# Patient Record
Sex: MALE | Race: WHITE | Employment: FULL TIME | URBAN - METROPOLITAN AREA
[De-identification: names, ages, dates, MRNs, and addresses within clinical notes are randomized per-mention and may not be internally consistent; named-entity substitution may affect disease eponyms.]

---

## 2017-04-12 ENCOUNTER — HOSPITAL ENCOUNTER (OUTPATIENT)
Dept: LAB | Age: 67
Discharge: HOME | End: 2017-04-12
Payer: COMMERCIAL

## 2017-04-12 DIAGNOSIS — J44.9 CHRONIC OBSTRUCTIVE PULMONARY DISEASE, UNSPECIFIED COPD TYPE (HCC): Primary | ICD-10-CM

## 2017-04-12 DIAGNOSIS — J44.9 CHRONIC OBSTRUCTIVE PULMONARY DISEASE, UNSPECIFIED COPD TYPE (HCC): ICD-10-CM

## 2017-04-12 LAB
ALBUMIN SERPL BCP-MCNC: 3.8 G/DL (ref 3.4–5)
ALBUMIN/GLOB SERPL: 1.2 {RATIO} (ref 0.8–1.7)
ALP SERPL-CCNC: 72 U/L (ref 45–117)
ALT SERPL-CCNC: 33 U/L (ref 16–61)
ANION GAP BLD CALC-SCNC: 6 MMOL/L (ref 3–18)
APPEARANCE UR: CLEAR
AST SERPL W P-5'-P-CCNC: 15 U/L (ref 15–37)
BASOPHILS # BLD AUTO: 0 K/UL (ref 0–0.06)
BASOPHILS # BLD: 1 % (ref 0–2)
BILIRUB SERPL-MCNC: 0.8 MG/DL (ref 0.2–1)
BILIRUB UR QL: NEGATIVE
BUN SERPL-MCNC: 14 MG/DL (ref 7–18)
BUN/CREAT SERPL: 16 (ref 12–20)
CALCIUM SERPL-MCNC: 8.5 MG/DL (ref 8.5–10.1)
CHLORIDE SERPL-SCNC: 107 MMOL/L (ref 100–108)
CHOLEST SERPL-MCNC: 133 MG/DL
CO2 SERPL-SCNC: 29 MMOL/L (ref 21–32)
COLOR UR: YELLOW
CREAT SERPL-MCNC: 0.87 MG/DL (ref 0.6–1.3)
DIFFERENTIAL METHOD BLD: ABNORMAL
EOSINOPHIL # BLD: 0.1 K/UL (ref 0–0.4)
EOSINOPHIL NFR BLD: 2 % (ref 0–5)
ERYTHROCYTE [DISTWIDTH] IN BLOOD BY AUTOMATED COUNT: 11.1 % (ref 11.6–14.5)
GLOBULIN SER CALC-MCNC: 3.2 G/DL (ref 2–4)
GLUCOSE SERPL-MCNC: 106 MG/DL (ref 74–99)
GLUCOSE UR STRIP.AUTO-MCNC: NEGATIVE MG/DL
HCT VFR BLD AUTO: 42.2 % (ref 36–48)
HDLC SERPL-MCNC: 48 MG/DL (ref 40–60)
HDLC SERPL: 2.8 {RATIO} (ref 0–5)
HGB BLD-MCNC: 13.6 G/DL (ref 13–16)
HGB UR QL STRIP: NEGATIVE
KETONES UR QL STRIP.AUTO: NEGATIVE MG/DL
LDLC SERPL CALC-MCNC: 67.4 MG/DL (ref 0–100)
LEUKOCYTE ESTERASE UR QL STRIP.AUTO: NEGATIVE
LIPID PROFILE,FLP: NORMAL
LYMPHOCYTES # BLD AUTO: 27 % (ref 21–52)
LYMPHOCYTES # BLD: 1.8 K/UL (ref 0.9–3.6)
MCH RBC QN AUTO: 33.7 PG (ref 24–34)
MCHC RBC AUTO-ENTMCNC: 32.2 G/DL (ref 31–37)
MCV RBC AUTO: 104.5 FL (ref 74–97)
MONOCYTES # BLD: 0.6 K/UL (ref 0.05–1.2)
MONOCYTES NFR BLD AUTO: 9 % (ref 3–10)
NEUTS SEG # BLD: 4.1 K/UL (ref 1.8–8)
NEUTS SEG NFR BLD AUTO: 61 % (ref 40–73)
NITRITE UR QL STRIP.AUTO: NEGATIVE
PH UR STRIP: 7.5 [PH] (ref 5–8)
PLATELET # BLD AUTO: 297 K/UL (ref 135–420)
PMV BLD AUTO: 10.5 FL (ref 9.2–11.8)
POTASSIUM SERPL-SCNC: 4.2 MMOL/L (ref 3.5–5.5)
PROT SERPL-MCNC: 7 G/DL (ref 6.4–8.2)
PROT UR STRIP-MCNC: NEGATIVE MG/DL
PSA SERPL-MCNC: 0.7 NG/ML (ref 0–4)
RBC # BLD AUTO: 4.04 M/UL (ref 4.7–5.5)
SODIUM SERPL-SCNC: 142 MMOL/L (ref 136–145)
SP GR UR REFRACTOMETRY: 1.01 (ref 1–1.03)
TRIGL SERPL-MCNC: 88 MG/DL (ref ?–150)
TSH SERPL DL<=0.05 MIU/L-ACNC: 1.01 UIU/ML (ref 0.36–3.74)
UROBILINOGEN UR QL STRIP.AUTO: 0.2 EU/DL (ref 0.2–1)
VLDLC SERPL CALC-MCNC: 17.6 MG/DL
WBC # BLD AUTO: 6.7 K/UL (ref 4.6–13.2)

## 2017-04-12 PROCEDURE — 84443 ASSAY THYROID STIM HORMONE: CPT | Performed by: FAMILY MEDICINE

## 2017-04-12 PROCEDURE — 80061 LIPID PANEL: CPT | Performed by: FAMILY MEDICINE

## 2017-04-12 PROCEDURE — 85025 COMPLETE CBC W/AUTO DIFF WBC: CPT | Performed by: FAMILY MEDICINE

## 2017-04-12 PROCEDURE — 84153 ASSAY OF PSA TOTAL: CPT | Performed by: FAMILY MEDICINE

## 2017-04-12 PROCEDURE — 81003 URINALYSIS AUTO W/O SCOPE: CPT | Performed by: FAMILY MEDICINE

## 2017-04-12 PROCEDURE — 80053 COMPREHEN METABOLIC PANEL: CPT | Performed by: FAMILY MEDICINE

## 2017-04-12 PROCEDURE — 36415 COLL VENOUS BLD VENIPUNCTURE: CPT | Performed by: FAMILY MEDICINE

## 2017-06-14 ENCOUNTER — OFFICE VISIT (OUTPATIENT)
Dept: FAMILY MEDICINE CLINIC | Age: 67
End: 2017-06-14

## 2017-06-14 VITALS
SYSTOLIC BLOOD PRESSURE: 163 MMHG | DIASTOLIC BLOOD PRESSURE: 90 MMHG | HEART RATE: 69 BPM | WEIGHT: 235.4 LBS | TEMPERATURE: 96.3 F | BODY MASS INDEX: 34.87 KG/M2 | HEIGHT: 69 IN | RESPIRATION RATE: 18 BRPM | OXYGEN SATURATION: 98 %

## 2017-06-14 DIAGNOSIS — G47.30 SLEEP APNEA, UNSPECIFIED TYPE: ICD-10-CM

## 2017-06-14 DIAGNOSIS — J44.9 CHRONIC OBSTRUCTIVE PULMONARY DISEASE, UNSPECIFIED COPD TYPE (HCC): ICD-10-CM

## 2017-06-14 DIAGNOSIS — Z00.00 ROUTINE GENERAL MEDICAL EXAMINATION AT A HEALTH CARE FACILITY: Primary | ICD-10-CM

## 2017-06-14 RX ORDER — MONTELUKAST SODIUM 10 MG/1
10 TABLET ORAL DAILY
Qty: 90 TAB | Refills: 4 | Status: SHIPPED | OUTPATIENT
Start: 2017-06-14 | End: 2018-07-05 | Stop reason: SDUPTHER

## 2017-06-14 RX ORDER — ALBUTEROL SULFATE 90 UG/1
2 AEROSOL, METERED RESPIRATORY (INHALATION)
Qty: 1 INHALER | Refills: 12 | Status: SHIPPED | OUTPATIENT
Start: 2017-06-14

## 2017-06-14 RX ORDER — BUDESONIDE AND FORMOTEROL FUMARATE DIHYDRATE 160; 4.5 UG/1; UG/1
2 AEROSOL RESPIRATORY (INHALATION) 2 TIMES DAILY
Qty: 3 INHALER | Refills: 12 | Status: SHIPPED | OUTPATIENT
Start: 2017-06-14 | End: 2018-07-04 | Stop reason: SDUPTHER

## 2017-06-14 NOTE — PROGRESS NOTES
Malorie Wheatley is a 61 y.o.  male and presents for a preventive health care visit        Subjective:  Health Maintenance History  Immunizations reviewed, none indicated. colonoscopy: utd , Eye exam: utd , Chest CT: na ,      Patient Active Problem List    Diagnosis Date Noted    Advance directive in chart 06/22/2016    COPD (chronic obstructive pulmonary disease) ([facility] 02/08/2016    Sleep apnea 03/21/2011     Current Outpatient Prescriptions   Medication Sig Dispense Refill    montelukast (SINGULAIR) 10 mg tablet Take 1 Tab by mouth daily. 90 Tab 4    albuterol (PROVENTIL HFA, VENTOLIN HFA, PROAIR HFA) 90 mcg/actuation inhaler Take 2 Puffs by inhalation every six (6) hours as needed for Shortness of Breath (use with spacer device). 1 Inhaler 12    budesonide-formoterol (SYMBICORT) 160-4.5 mcg/actuation HFA inhaler Take 2 Puffs by inhalation two (2) times a day. Use every day with spacer device 3 Inhaler 12    tiotropium-olodaterol (STIOLTO RESPIMAT) 2.5-2.5 mcg/actuation mist Take 1 Inhalation by inhalation daily. 3 Inhaler 12    inhalational spacing device Use with metered dose inhaler 1 Device 1     No Known Allergies  Past Medical History:   Diagnosis Date    Advance directive in chart 6/22/2016    COPD (chronic obstructive pulmonary disease) ([facility] 10/6/2015    COPD (chronic obstructive pulmonary disease) ([facility] 2/8/2016    Sleep apnea 3/21/2011     History reviewed. No pertinent surgical history.   Family History   Problem Relation Age of Onset    Stroke Mother      Social History   Substance Use Topics    Smoking status: Never Smoker    Smokeless tobacco: Never Used    Alcohol use Yes           ROS       General: negative for - chills, fatigue, fever, weight change  Psych: negative for - anxiety, depression, irritability or mood swings  ENT: negative for - headaches, hearing change, nasal congestion, oral lesions, sneezing or sore throat  Heme/ Lymph: negative for - bleeding problems, bruising, pallor or swollen lymph nodes  Endo: negative for - hot flashes, polydipsia/polyuria or temperature intolerance  Resp: negative for - cough, shortness of breath or wheezing  CV: negative for - chest pain, edema or palpitations  GI: negative for - abdominal pain, change in bowel habits, constipation, diarrhea or nausea/vomiting  : negative for - dysuria, hematuria, incontinence, pelvic pain or vulvar/vaginal symptoms  MSK: negative for - joint pain, joint swelling or muscle pain  Neuro: negative for - confusion, headaches, seizures or weakness  Derm: negative for - dry skin, hair changes, rash or skin lesion changes        Objective:  Vitals:    06/14/17 0754   BP: 163/90   Pulse: 69   Resp: 18   Temp: 96.3 °F (35.7 °C)   TempSrc: Oral   SpO2: 98%   Weight: 235 lb 6.4 oz (106.8 kg)   Height: 5' 9\" (1.753 m)   PainSc:   0 - No pain     alert, well appearing, and in no distress, oriented to person, place, and time and overweight  General appearance - alert, well appearing, and in no distress, oriented to person, place, and time and normal appearing weight   Visit Vitals    /90 (BP 1 Location: Right arm, BP Patient Position: Sitting)    Pulse 69    Temp 96.3 °F (35.7 °C) (Oral)    Resp 18    Ht 5' 9\" (1.753 m)    Wt 235 lb 6.4 oz (106.8 kg)    SpO2 98%    BMI 34.76 kg/m2       General appearance  alert, cooperative, no distress, appears stated age   Head  Normocephalic, without obvious abnormality, atraumatic   Eyes  conjunctivae/corneas clear. PERRL, EOM's intact. Fundi benign   Ears  normal TM's and external ear canals AU   Nose Nares normal. Septum midline. Mucosa normal. No drainage or sinus tenderness. Throat Lips, mucosa, and tongue normal. Teeth and gums normal   Neck supple, symmetrical, trachea midline, no adenopathy, thyroid: not enlarged, symmetric, no tenderness/mass/nodules, no carotid bruit and no JVD   Back   symmetric, no curvature.  ROM normal. No CVA tenderness Lungs   clear to auscultation bilaterally   Chest wall  no tenderness   Heart  regular rate and rhythm, S1, S2 normal, no murmur, click, rub or gallop   Abdomen   soft, non-tender. Bowel sounds normal. No masses,  No organomegaly   Genitalia  Normal male   Rectal  Normal tone, normal prostate, no masses or tenderness  Guaiac negative stool   Extremities extremities normal, atraumatic, no cyanosis or edema   Pulses 2+ and symmetric   Skin Skin color, texture, turgor normal. No rashes or lesions   Lymph nodes Cervical, supraclavicular, and axillary nodes normal.   Neurologic Normal       LABS  Component      Latest Ref Rng & Units 4/12/2017 4/12/2017 4/12/2017 4/12/2017           9:50 AM  9:49 AM  9:49 AM  9:49 AM   WBC      4.6 - 13.2 K/uL       RBC      4.70 - 5.50 M/uL       HGB      13.0 - 16.0 g/dL       HCT      36.0 - 48.0 %       MCV      74.0 - 97.0 FL       MCH      24.0 - 34.0 PG       MCHC      31.0 - 37.0 g/dL       RDW      11.6 - 14.5 %       PLATELET      755 - 453 K/uL       MPV      9.2 - 11.8 FL       NEUTROPHILS      40 - 73 %       LYMPHOCYTES      21 - 52 %       MONOCYTES      3 - 10 %       EOSINOPHILS      0 - 5 %       BASOPHILS      0 - 2 %       ABS. NEUTROPHILS      1.8 - 8.0 K/UL       ABS. LYMPHOCYTES      0.9 - 3.6 K/UL       ABS. MONOCYTES      0.05 - 1.2 K/UL       ABS. EOSINOPHILS      0.0 - 0.4 K/UL       ABS.  BASOPHILS      0.0 - 0.06 K/UL       DF             Sodium      136 - 145 mmol/L    142   Potassium      3.5 - 5.5 mmol/L    4.2   Chloride      100 - 108 mmol/L    107   CO2      21 - 32 mmol/L    29   Anion gap      3.0 - 18 mmol/L    6   Glucose      74 - 99 mg/dL    106 (H)   BUN      7.0 - 18 MG/DL    14   Creatinine      0.6 - 1.3 MG/DL    0.87   BUN/Creatinine ratio      12 - 20      16   GFR est AA      >60 ml/min/1.73m2    >60   GFR est non-AA      >60 ml/min/1.73m2    >60   Calcium      8.5 - 10.1 MG/DL    8.5   Bilirubin, total      0.2 - 1.0 MG/DL    0.8   ALT (SGPT)      16 - 61 U/L    33   AST      15 - 37 U/L    15   Alk. phosphatase      45 - 117 U/L    72   Protein, total      6.4 - 8.2 g/dL    7.0   Albumin      3.4 - 5.0 g/dL    3.8   Globulin      2.0 - 4.0 g/dL    3.2   A-G Ratio      0.8 - 1.7      1.2   Color       YELLOW      Appearance       CLEAR      Specific gravity      1.005 - 1.030   1.011      pH (UA)      5.0 - 8.0   7.5      Protein      NEG mg/dL NEGATIVE      Glucose      NEG mg/dL NEGATIVE      Ketone      NEG mg/dL NEGATIVE      Bilirubin      NEG   NEGATIVE      Blood      NEG   NEGATIVE      Urobilinogen      0.2 - 1.0 EU/dL 0.2      Nitrites      NEG   NEGATIVE      Leukocyte Esterase      NEG   NEGATIVE      Cholesterol, total      <200 MG/DL       Triglyceride      <150 MG/DL       HDL Cholesterol      40 - 60 MG/DL       LDL, calculated      0 - 100 MG/DL       VLDL, calculated      MG/DL       CHOL/HDL Ratio      0 - 5.0         Prostate Specific Ag      0.0 - 4.0 ng/mL  0.7     TSH      0.36 - 3.74 uIU/mL   1.01      Component      Latest Ref Rng & Units 4/12/2017 4/12/2017           9:49 AM  9:49 AM   WBC      4.6 - 13.2 K/uL  6.7   RBC      4.70 - 5.50 M/uL  4.04 (L)   HGB      13.0 - 16.0 g/dL  13.6   HCT      36.0 - 48.0 %  42.2   MCV      74.0 - 97.0 FL  104.5 (H)   MCH      24.0 - 34.0 PG  33.7   MCHC      31.0 - 37.0 g/dL  32.2   RDW      11.6 - 14.5 %  11.1 (L)   PLATELET      589 - 986 K/uL  297   MPV      9.2 - 11.8 FL  10.5   NEUTROPHILS      40 - 73 %  61   LYMPHOCYTES      21 - 52 %  27   MONOCYTES      3 - 10 %  9   EOSINOPHILS      0 - 5 %  2   BASOPHILS      0 - 2 %  1   ABS. NEUTROPHILS      1.8 - 8.0 K/UL  4.1   ABS. LYMPHOCYTES      0.9 - 3.6 K/UL  1.8   ABS. MONOCYTES      0.05 - 1.2 K/UL  0.6   ABS. EOSINOPHILS      0.0 - 0.4 K/UL  0.1   ABS.  BASOPHILS      0.0 - 0.06 K/UL  0.0   DF        AUTOMATED   Sodium      136 - 145 mmol/L     Potassium      3.5 - 5.5 mmol/L     Chloride      100 - 108 mmol/L     CO2      21 - 32 mmol/L     Anion gap      3.0 - 18 mmol/L     Glucose      74 - 99 mg/dL     BUN      7.0 - 18 MG/DL     Creatinine      0.6 - 1.3 MG/DL     BUN/Creatinine ratio      12 - 20       GFR est AA      >60 ml/min/1.73m2     GFR est non-AA      >60 ml/min/1.73m2     Calcium      8.5 - 10.1 MG/DL     Bilirubin, total      0.2 - 1.0 MG/DL     ALT (SGPT)      16 - 61 U/L     AST      15 - 37 U/L     Alk. phosphatase      45 - 117 U/L     Protein, total      6.4 - 8.2 g/dL     Albumin      3.4 - 5.0 g/dL     Globulin      2.0 - 4.0 g/dL     A-G Ratio      0.8 - 1.7       Color           Appearance           Specific gravity      1.005 - 1.030       pH (UA)      5.0 - 8.0       Protein      NEG mg/dL     Glucose      NEG mg/dL     Ketone      NEG mg/dL     Bilirubin      NEG       Blood      NEG       Urobilinogen      0.2 - 1.0 EU/dL     Nitrites      NEG       Leukocyte Esterase      NEG       Cholesterol, total      <200 MG/    Triglyceride      <150 MG/DL 88    HDL Cholesterol      40 - 60 MG/DL 48    LDL, calculated      0 - 100 MG/DL 67.4    VLDL, calculated      MG/DL 17.6    CHOL/HDL Ratio      0 - 5.0   2.8    Prostate Specific Ag      0.0 - 4.0 ng/mL     TSH      0.36 - 3.74 uIU/mL       TESTS  ekg  nsr    Assessment/Plan:  Healthy patient except as noted below:    Health Maintenance up to date. Recommend f/u physical 1 year. Routine screening labs/tests recommended prior to next physical.      Lab review: labs are reviewed, up to date and normal        I have discussed the diagnosis with the patient and the intended plan as seen in the above orders. The patient has received an after-visit summary and questions were answered concerning future plans. I have discussed medication side effects and warnings with the patient as well. I have reviewed the plan of care with the patient, accepted their input and they are in agreement with the treatment goals.          Follow-up Disposition:  Return in about 1 year (around 6/14/2018) for physical, labs at next visit, spirometry next visit, EKG next visit.    -------------------------------------------------------------------------------------------------------------------    Problem Assessment  and also with   Chief Complaint   Patient presents with    Sleep Apnea    COPD         HPI ;  COPD Review:  The patient is being seen for follow up of COPD and sleep apnea. Oxygen: He currently is not on home oxygen therapy. Symptoms: wheezing. Patient uses 2 pillows at night. Patient does not smoke cigarettes. Additional Concerns: followed by sleep apnea. Assessment/Plan:      Copd stable well controlled  Sleep apnea  -- I haven't received notes from sleep apnea will request.  Note bp today is a bit elevated. He will recheck several times and f/u in 2 mo if it persists.         Lab review: labs are reviewed, up to date and normal

## 2017-06-14 NOTE — MR AVS SNAPSHOT
Visit Information Date & Time Provider Department Dept. Phone Encounter #  
 6/14/2017  9:00  [facility] [phone] [id number] Follow-up Instructions Return in about 1 year (around 6/14/2018) for physical, labs at next visit, spirometry next visit, EKG next visit. Follow-up and Disposition History Your Appointments 6/14/2017  9:00 AM  
COMPLETE PHYSICAL with Ray Lopez DO [address] [place] [address]) Appt Note: 8 months (around 6/6/2016) for physical, spirometry next visit, EKG next visit, labs; Confirmed 6/13/2017 ce  
 [address] [facility] [address]  
  
   
 [address] [facility] Upcoming Health Maintenance Date Due INFLUENZA AGE 9 TO ADULT 8/1/2017 COLONOSCOPY 3/21/2021 DTaP/Tdap/Td series (2 - Td) 10/6/2025 Allergies as of 6/14/2017  Review Complete On: 6/14/2017 By: Ray Lopez DO No Known Allergies Current Immunizations  Reviewed on 9/8/2015 Name Date Influenza Vaccine 11/12/2014, 11/1/2013  2:05 PM  
 Influenza Vaccine (Quad) PF 9/8/2015 Influenza Vaccine Whole 3/21/2010 Pneumococcal Conjugate (PCV-13) 9/8/2015 Pneumococcal Vaccine (Unspecified Type) 3/21/2009 TD Vaccine 3/21/2006 Tdap 10/6/2015 Zoster 8/27/2012 Not reviewed this visit You Were Diagnosed With   
  
 Codes Comments Routine general medical examination at a health care facility    -  Primary ICD-10-CM: Z00.00 ICD-9-CM: V70.0 Chronic obstructive pulmonary disease, unspecified COPD type ([facility]     ICD-10-CM: J44.9 ICD-9-CM: 587 Sleep apnea, unspecified type     ICD-10-CM: G47.30 ICD-9-CM: 780.57 Vitals BP Pulse Temp Resp Height(growth percentile) Weight(growth percentile)  163/90 (BP 1 Location: Right arm, BP Patient Position: Sitting) 69 96.3 °F (35.7 °C) (Oral) 18 5' 9\" (1.753 m) 235 lb 6.4 oz (106.8 kg) SpO2 BMI Smoking Status 98% 34.76 kg/m2 Never Smoker BMI and BSA Data Body Mass Index Body Surface Area 34.76 kg/m 2 2.28 m 2 Preferred Pharmacy Pharmacy Name Phone [facility] [address]  [facility] [phone] Your Updated Medication List  
  
   
This list is accurate as of: 6/14/17  8:27 AM.  Always use your most recent med list.  
  
  
  
  
 albuterol 90 mcg/actuation inhaler Commonly known as:  PROVENTIL HFA, VENTOLIN HFA, PROAIR HFA Take 2 Puffs by inhalation every six (6) hours as needed for Shortness of Breath (use with spacer device). budesonide-formoterol 160-4.5 mcg/actuation HFA inhaler Commonly known as:  SYMBICORT Take 2 Puffs by inhalation two (2) times a day. Use every day with spacer device  
  
 inhalational spacing device Use with metered dose inhaler  
  
 montelukast 10 mg tablet Commonly known as:  SINGULAIR Take 1 Tab by mouth daily. tiotropium-olodaterol 2.5-2.5 mcg/actuation Mist  
Commonly known as:  Marcjovanie Kleine Take 1 Inhalation by inhalation daily. Prescriptions Printed Refills  
 montelukast (SINGULAIR) 10 mg tablet 4 Sig: Take 1 Tab by mouth daily. Class: Print Route: Oral  
 albuterol (PROVENTIL HFA, VENTOLIN HFA, PROAIR HFA) 90 mcg/actuation inhaler 12 Sig: Take 2 Puffs by inhalation every six (6) hours as needed for Shortness of Breath (use with spacer device). Class: Print Route: Inhalation  
 budesonide-formoterol (SYMBICORT) 160-4.5 mcg/actuation HFA inhaler 12 Sig: Take 2 Puffs by inhalation two (2) times a day. Use every day with spacer device Class: Print Route: Inhalation  
 tiotropium-olodaterol (STIOLTO RESPIMAT) 2.5-2.5 mcg/actuation mist 12 Sig: Take 1 Inhalation by inhalation daily. Class: Print Route: Inhalation We Performed the Following AMB POC EKG ROUTINE W/ 12 LEADS, INTER & REP [71747 CPT(R)] AMB POC SPIROMETRY W/O BRONCHODILATOR [50057 CPT(R)] Follow-up Instructions Return in about 1 year (around 6/14/2018) for physical, labs at next visit, spirometry next visit, EKG next visit. Patient Instructions Get your blood pressure checked weekly and keep a log. If it persists over 140/85 make f/u appt with Dr Kecia Hutchinson in 1 or 2 mo Introducing [facility] & HEALTH SERVICES! [employer] introduces [employer] patient portal. Now you can access parts of your medical record, email your doctor's office, and request medication refills online. 1. In your internet browser, go to [email or web address] 2. Click on the First Time User? Click Here link in the Sign In box. You will see the New Member Sign Up page. 3. Enter your [employer] Access Code exactly as it appears below. You will not need to use this code after youve completed the sign-up process. If you do not sign up before the expiration date, you must request a new code. · [employer] Access Code: [email or web address] Expires: 7/11/2017  9:34 AM 
 
4. Enter the last four digits of your Social Security Number (xxxx) and Date of Birth (mm/dd/yyyy) as indicated and click Submit. You will be taken to the next sign-up page. 5. Create a [employer]â€™s ID. This will be your [employer] login ID and cannot be changed, so think of one that is secure and easy to remember. 6. Create a [employer]â€™s password. You can change your password at any time. 7. Enter your Password Reset Question and Answer. This can be used at a later time if you forget your password. 8. Enter your e-mail address. You will receive e-mail notification when new information is available in [address] 9. Click Sign Up. You can now view and download portions of your medical record. 10. Click the Download Summary menu link to download a portable copy of your medical information. If you have questions, please visit the Frequently Asked Questions section of the [employer] website. Remember, [employer] is NOT to be used for urgent needs. For medical emergencies, dial 911. Now available from your iPhone and Android! Please provide this summary of care documentation to your next provider. Your primary care clinician is listed as [id number] [facility]. If you have any questions after today's visit, please call [phone].

## 2017-06-14 NOTE — PROGRESS NOTES
Mark Marcial is a 61 y.o. male presents today for his medicare wellness exam.            Pt is in Room # 6      Learning Assessment (baseline): Completed  Depression Screening: Completed  Fall Risk Screening: Completed  Abuse screening: Completed  ADL Assessment: Completed    1. Have you been to the ER, urgent care clinic since your last visit? Hospitalized since your last visit? No    2. Have you seen or consulted any other health care providers outside of the [facility] since your last visit? Include any pap smears or colon screening.  Yes When: feb/mar 2017 Where: Dr. Emil Isaac and Dr. Charli Todd Reason for visit: pulmonary follow up and sleep study follow up

## 2017-06-14 NOTE — ACP (ADVANCE CARE PLANNING)
Advance Care Planning (ACP) Provider Note - Comprehensive     Date of ACP Conversation: 06/14/17  Persons included in Conversation:  patient  Length of ACP Conversation in minutes:  <16 minutes (Non-Billable)    Authorized Decision Maker (if patient is incapable of making informed decisions): This person is:  Healthcare Agent/Medical Power of  under Advance Directive          General ACP for ALL Patients with Decision Making Capacity:   Importance of advance care planning, including choosing a healthcare agent to communicate patient's healthcare decisions if patient lost the ability to make decisions, such as after a sudden illness or accident    Review of Existing Advance Directive:       For Serious or Chronic Illness:  Understanding of medical condition      Interventions Provided:  Reviewed existing Advance Directive

## 2017-06-14 NOTE — PATIENT INSTRUCTIONS
Get your blood pressure checked weekly and keep a log.   If it persists over 140/85 make f/u appt with Dr Mariam Aguilar in 1 or 2 mo

## 2017-09-19 ENCOUNTER — OFFICE VISIT (OUTPATIENT)
Dept: FAMILY MEDICINE CLINIC | Age: 67
End: 2017-09-19

## 2017-09-19 DIAGNOSIS — Z23 ENCOUNTER FOR IMMUNIZATION: Primary | ICD-10-CM

## 2017-09-19 NOTE — MR AVS SNAPSHOT
Visit Information Date & Time Provider Department Dept. Phone Encounter #  
 9/19/2017  7:30 AM Elana Peralta, [address] [facility] [phone] [id number] Your Appointments 9/19/2017  7:30 AM  
Follow Up with Elana Peralta,  [address] [address] [facility]) Appt Note: FLU SHOT ONLY  
 [facility] [address] [facility] [address] [place] [address] [address] Upcoming Health Maintenance Date Due INFLUENZA AGE 9 TO ADULT 8/1/2017 Pneumococcal 65+ Low/Medium Risk (2 of 2 - PPSV23) 8/25/2017 MEDICARE YEARLY EXAM 8/25/2017 GLAUCOMA SCREENING Q2Y 9/18/2019 COLONOSCOPY 3/21/2021 DTaP/Tdap/Td series (2 - Td) 10/6/2025 Allergies as of 9/19/2017  Review Complete On: 6/14/2017 By: Elana Peralta,  No Known Allergies Current Immunizations  Reviewed on 9/8/2015 Name Date Influenza High Dose Vaccine PF  Incomplete Influenza Vaccine 11/12/2014, 11/1/2013  2:05 PM  
 Influenza Vaccine (Quad) PF 9/8/2015 Influenza Vaccine Whole 3/21/2010 Pneumococcal Conjugate (PCV-13) 9/8/2015 TD Vaccine 3/21/2006 Tdap 10/6/2015 ZZZ-RETIRED (DO NOT USE) Pneumococcal Vaccine (Unspecified Type) 3/21/2009 Zoster 8/27/2012 Not reviewed this visit You Were Diagnosed With   
  
 Codes Comments Encounter for immunization    -  Primary ICD-10-CM: W19 ICD-9-CM: V03.89 Vitals Smoking Status Never Smoker Preferred Pharmacy Pharmacy Name Phone [id number] [address] [place] [address] Your Updated Medication List  
  
   
This list is accurate as of: 9/19/17  7:27 AM.  Always use your most recent med list.  
  
  
  
  
 albuterol 90 mcg/actuation inhaler Commonly known as:  PROVENTIL HFA, VENTOLIN HFA, PROAIR HFA  
 Take 2 Puffs by inhalation every six (6) hours as needed for Shortness of Breath (use with spacer device). budesonide-formoterol 160-4.5 mcg/actuation HFA inhaler Commonly known as:  SYMBICORT Take 2 Puffs by inhalation two (2) times a day. Use every day with spacer device  
  
 inhalational spacing device Use with metered dose inhaler  
  
 montelukast 10 mg tablet Commonly known as:  SINGULAIR Take 1 Tab by mouth daily. tiotropium-olodaterol 2.5-2.5 mcg/actuation Mist  
Commonly known as:  Leita Marshallese Take 1 Inhalation by inhalation daily. We Performed the Following INFLUENZA VIRUS VACCINE, HIGH DOSE SEASONAL, PRESERVATIVE FREE [41136 CPT(R)] Introducing [facility] & [facility] SERVICES! Melvin Curtis introduces [employer] patient portal. Now you can access parts of your medical record, email your doctor's office, and request medication refills online. 1. In your internet browser, go to [email or web address] 2. Click on the First Time User? Click Here link in the Sign In box. You will see the New Member Sign Up page. 3. Enter your [employer] Access Code exactly as it appears below. You will not need to use this code after youve completed the sign-up process. If you do not sign up before the expiration date, you must request a new code. · [employer] Access Code: [email or web address] Expires: 12/18/2017  7:11 AM 
 
4. Enter the last four digits of your Social Security Number (xxxx) and Date of Birth (mm/dd/yyyy) as indicated and click Submit. You will be taken to the next sign-up page. 5. Create a REM ENTERPRISEt ID. This will be your [employer] login ID and cannot be changed, so think of one that is secure and easy to remember. 6. Create a REM ENTER[employer]ISEt password. You can change your password at any time. 7. Enter your Password Reset Question and Answer. This can be used at a later time if you forget your password. 8. Enter your e-mail address. You will receive e-mail notification when new information is available in [address] 9. Click Sign Up. You can now view and download portions of your medical record. 10. Click the Download Summary menu link to download a portable copy of your medical information. If you have questions, please visit the Frequently Asked Questions section of the [employer] website. Remember, [employer] is NOT to be used for urgent needs. For medical emergencies, dial 911. Now available from your iPhone and Android! Please provide this summary of care documentation to your next provider. Your primary care clinician is listed as [phone] [facility]. If you have any questions after today's visit, please call [phone].

## 2018-07-04 DIAGNOSIS — J44.9 CHRONIC OBSTRUCTIVE PULMONARY DISEASE, UNSPECIFIED COPD TYPE (HCC): ICD-10-CM

## 2018-07-04 RX ORDER — BUDESONIDE AND FORMOTEROL FUMARATE DIHYDRATE 160; 4.5 UG/1; UG/1
AEROSOL RESPIRATORY (INHALATION)
Qty: 30.6 INHALER | Refills: 9 | Status: SHIPPED | OUTPATIENT
Start: 2018-07-04

## 2018-07-05 DIAGNOSIS — J44.9 CHRONIC OBSTRUCTIVE PULMONARY DISEASE, UNSPECIFIED COPD TYPE (HCC): ICD-10-CM

## 2018-07-05 RX ORDER — MONTELUKAST SODIUM 10 MG/1
TABLET ORAL
Qty: 90 TAB | Refills: 3 | Status: SHIPPED | OUTPATIENT
Start: 2018-07-05

## 2021-05-05 ENCOUNTER — OFFICE VISIT (OUTPATIENT)
Dept: ORTHOPEDIC SURGERY | Age: 71
End: 2021-05-05
Payer: COMMERCIAL

## 2021-05-05 VITALS — RESPIRATION RATE: 16 BRPM | WEIGHT: 239 LBS | BODY MASS INDEX: 35.4 KG/M2 | HEIGHT: 69 IN | TEMPERATURE: 98.4 F

## 2021-05-05 DIAGNOSIS — G89.29 CHRONIC PAIN OF RIGHT KNEE: Primary | ICD-10-CM

## 2021-05-05 DIAGNOSIS — M25.561 CHRONIC PAIN OF RIGHT KNEE: Primary | ICD-10-CM

## 2021-05-05 PROCEDURE — 99203 OFFICE O/P NEW LOW 30 MIN: CPT | Performed by: ORTHOPAEDIC SURGERY

## 2021-05-05 PROCEDURE — 73562 X-RAY EXAM OF KNEE 3: CPT | Performed by: ORTHOPAEDIC SURGERY

## 2021-05-05 RX ORDER — MELOXICAM 15 MG/1
15 TABLET ORAL DAILY
Qty: 90 TAB | Refills: 2 | Status: SHIPPED | OUTPATIENT
Start: 2021-05-05

## 2021-05-05 NOTE — PROGRESS NOTES
Patient: Kelly Arguello                MRN: [id number]       SSN: [id number]  YOB: 1950        AGE: 78 y.o. SEX: male  Body mass index is 35.29 kg/m². PCP: Farheen Gallegos MD  05/05/21    CHIEF COMPLAINT: Right knee pain x1 month    HPI: Shirley Dominique II is a 78 y.o. male patient who presents to the office today for right knee pain for the past month. He rates the pain as 4-10. He says he has been having increasing pain since returning to the gym as well as riding in a smaller car to work. He has about an hour long commute to work. He has taken anti-inflammatory over-the-counter which is helped with the pain. He denies any specific injury or trauma to his knee recently. He says he did have a hairline fracture in 2006 in his right knee that was treated nonoperatively. Past Medical History:   Diagnosis Date    Advance directive in chart 6/22/2016    COPD (chronic obstructive pulmonary disease) ([facility] 10/6/2015    [facility] 2/8/2016    Sleep apnea 3/21/2011       Family History   Problem Relation Age of Onset    Stroke Mother        Current Outpatient Medications   Medication Sig Dispense Refill    tiotropium bromide (Spiriva Respimat) 2.5 mcg/actuation inhaler Take 2 Puffs by inhalation daily.  meloxicam (MOBIC) 15 mg tablet Take 1 Tab by mouth daily. 90 Tab 2    montelukast (SINGULAIR) 10 mg tablet TAKE 1 TAB BY MOUTH DAILY 90 Tab 3    SYMBICORT 160-4.5 mcg/actuation HFAA INHALE 2 PUFFS BY MOUTH TWO TIMES A DAY. USE EVERY DAY WITH SPACER DEVICE 30.6 Inhaler 9    albuterol (PROVENTIL HFA, VENTOLIN HFA, PROAIR HFA) 90 mcg/actuation inhaler Take 2 Puffs by inhalation every six (6) hours as needed for Shortness of Breath (use with spacer device).  1 Inhaler 12    inhalational spacing device Use with metered dose inhaler 1 Device 1    tiotropium-olodaterol (STIOLTO RESPIMAT) 2.5-2.5 mcg/actuation mist Take 1 Inhalation by inhalation daily. 3 Inhaler 12       No Known Allergies    History reviewed. No pertinent surgical history. Social History     Socioeconomic History    Marital status:      Spouse name: Not on file    Number of children: Not on file    Years of education: Not on file    Highest education level: Not on file   Occupational History    Not on file   Social Needs    Financial resource strain: Not on file    Food insecurity     Worry: Not on file     Inability: Not on file    Transportation needs     Medical: Not on file     Non-medical: Not on file   Tobacco Use    Smoking status: Never Smoker    Smokeless tobacco: Never Used   Substance and Sexual Activity    Alcohol use: Yes    Drug use: No    Sexual activity: Yes   Lifestyle    Physical activity     Days per week: Not on file     Minutes per session: Not on file    Stress: Not on file   Relationships    Social connections     Talks on phone: Not on file     Gets together: Not on file     Attends Voodoo service: Not on file     Active member of club or organization: Not on file     Attends meetings of clubs or organizations: Not on file     Relationship status: Not on file    Intimate partner violence     Fear of current or ex partner: Not on file     Emotionally abused: Not on file     Physically abused: Not on file     Forced sexual activity: Not on file   Other Topics Concern    Not on file   Social History Narrative    Not on file       REVIEW OF SYSTEMS:      CON: negative for recent weight loss/gain, fever, or chills  EYE: negative for double or blurry vision  ENT: negative for hoarseness  RS:   negative for cough, URI, SOB  CV:  negative for chest pain, palpitations  GI:    negative for blood in stool, nausea/vomiting  :  negative for blood in urine  MS: As per HPI  Other systems reviewed and noted below.     PHYSICAL EXAMINATION:  Visit Vitals  Temp 98.4 °F (36.9 °C)   Resp 16   Ht 5' 9\" (1.753 m)   Wt 239 lb (108.4 kg) Comment: pt reports   BMI 35.29 kg/m²     Body mass index is 35.29 kg/m². GENERAL: Alert and oriented x3, in no acute distress, well-developed, well-nourished. HEENT: Normocephalic, atraumatic. RESP: Non labored breathing with equal chest rise on inspiration. CV: Well perfused extremities. No cyanosis or clubbing noted. ABDOMEN: Soft, non-tender, non-distended. Knee Examination      R   L  Effusion   +   -  Warmth   -   -  Erythema   -   -  ROM   Extension  Full   Full   Flexion   Full   Full  Tenderness   Medial Joint  -   -   Lateral Joint  -   -   Posterior knee  -   -  Strength   Quad   5   5   Hamstring  5   5  Crepitus   Tibiofemoral   -   -   Patellofemoral  +   -  Instability   Anterior  -   -   Posterior  -   -   Patellofemoral  -   -  Lachman's   -   -  Anterior Drawer  -   -  Posterior Drawer  -   -  Yana's   Pain   -   -   Locking  -   -  Calf TTP   -   -  Straight Leg Raise  -   -      IMAGING:  X-rays 3 views of the right knee were taken the office today. These show mild tricompartmental osteoarthritis. There is arthritic change noted in the patellofemoral joint with osteophytes. ASSESSMENT & PLAN  Diagnosis: Right knee osteoarthritis    Korene Kawasaki has symptomatic right knee osteoarthritis. We discussed multiple treatments today. I went over his x-rays with him today. We will try an oral anti-inflammatory to be taken once daily to see if this helps with his pain. If his symptoms persist we did discuss the possibility of corticosteroid injection. I will see him back as needed.       Electronically signed by: Cinda Ordonez MD